# Patient Record
Sex: FEMALE | Race: WHITE
[De-identification: names, ages, dates, MRNs, and addresses within clinical notes are randomized per-mention and may not be internally consistent; named-entity substitution may affect disease eponyms.]

---

## 2019-02-07 ENCOUNTER — HOSPITAL ENCOUNTER (OUTPATIENT)
Dept: HOSPITAL 61 - SURGPAT | Age: 44
Discharge: HOME | End: 2019-02-07
Attending: OBSTETRICS & GYNECOLOGY
Payer: OTHER GOVERNMENT

## 2019-02-07 DIAGNOSIS — I10: ICD-10-CM

## 2019-02-07 DIAGNOSIS — Z01.818: Primary | ICD-10-CM

## 2019-02-07 DIAGNOSIS — Z90.710: ICD-10-CM

## 2019-02-07 LAB
ALBUMIN SERPL-MCNC: 3.5 G/DL (ref 3.4–5)
ALBUMIN/GLOB SERPL: 0.9 {RATIO} (ref 1–1.7)
ALP SERPL-CCNC: 73 U/L (ref 46–116)
ALT SERPL-CCNC: 32 U/L (ref 14–59)
ANION GAP SERPL CALC-SCNC: 9 MMOL/L (ref 6–14)
APTT PPP: YELLOW S
AST SERPL-CCNC: 17 U/L (ref 15–37)
BACTERIA #/AREA URNS HPF: (no result) /HPF
BASOPHILS # BLD AUTO: 0 X10^3/UL (ref 0–0.2)
BASOPHILS NFR BLD: 1 % (ref 0–3)
BILIRUB SERPL-MCNC: 0.2 MG/DL (ref 0.2–1)
BILIRUB UR QL STRIP: NEGATIVE
BUN SERPL-MCNC: 11 MG/DL (ref 7–20)
BUN/CREAT SERPL: 12 (ref 6–20)
CALCIUM SERPL-MCNC: 9.1 MG/DL (ref 8.5–10.1)
CHLORIDE SERPL-SCNC: 104 MMOL/L (ref 98–107)
CO2 SERPL-SCNC: 29 MMOL/L (ref 21–32)
CREAT SERPL-MCNC: 0.9 MG/DL (ref 0.6–1)
EOSINOPHIL NFR BLD: 0.2 X10^3/UL (ref 0–0.7)
EOSINOPHIL NFR BLD: 5 % (ref 0–3)
ERYTHROCYTE [DISTWIDTH] IN BLOOD BY AUTOMATED COUNT: 16.7 % (ref 11.5–14.5)
FIBRINOGEN PPP-MCNC: CLEAR MG/DL
GFR SERPLBLD BASED ON 1.73 SQ M-ARVRAT: 68.7 ML/MIN
GLOBULIN SER-MCNC: 3.8 G/DL (ref 2.2–3.8)
GLUCOSE SERPL-MCNC: 116 MG/DL (ref 70–99)
HCT VFR BLD CALC: 33.6 % (ref 36–47)
HGB BLD-MCNC: 10.9 G/DL (ref 12–15.5)
LYMPHOCYTES # BLD: 1 X10^3/UL (ref 1–4.8)
LYMPHOCYTES NFR BLD AUTO: 27 % (ref 24–48)
MCH RBC QN AUTO: 25 PG (ref 25–35)
MCHC RBC AUTO-ENTMCNC: 33 G/DL (ref 31–37)
MCV RBC AUTO: 78 FL (ref 79–100)
MONO #: 0.3 X10^3/UL (ref 0–1.1)
MONOCYTES NFR BLD: 8 % (ref 0–9)
NEUT #: 2.2 X10^3UL (ref 1.8–7.7)
NEUTROPHILS NFR BLD AUTO: 60 % (ref 31–73)
NITRITE UR QL STRIP: NEGATIVE
PH UR STRIP: 7 [PH]
PLATELET # BLD AUTO: 409 X10^3/UL (ref 140–400)
POTASSIUM SERPL-SCNC: 3.6 MMOL/L (ref 3.5–5.1)
PROT SERPL-MCNC: 7.3 G/DL (ref 6.4–8.2)
PROT UR STRIP-MCNC: NEGATIVE MG/DL
RBC # BLD AUTO: 4.31 X10^6/UL (ref 3.5–5.4)
RBC #/AREA URNS HPF: (no result) /HPF (ref 0–2)
SODIUM SERPL-SCNC: 142 MMOL/L (ref 136–145)
SQUAMOUS #/AREA URNS LPF: (no result) /LPF
UROBILINOGEN UR-MCNC: 0.2 MG/DL
WBC # BLD AUTO: 3.7 X10^3/UL (ref 4–11)
WBC #/AREA URNS HPF: (no result) /HPF (ref 0–4)

## 2019-02-07 PROCEDURE — 81001 URINALYSIS AUTO W/SCOPE: CPT

## 2019-02-07 PROCEDURE — 80053 COMPREHEN METABOLIC PANEL: CPT

## 2019-02-07 PROCEDURE — 85025 COMPLETE CBC W/AUTO DIFF WBC: CPT

## 2019-02-07 PROCEDURE — 93005 ELECTROCARDIOGRAM TRACING: CPT

## 2019-02-07 PROCEDURE — 36415 COLL VENOUS BLD VENIPUNCTURE: CPT

## 2019-02-07 PROCEDURE — 71046 X-RAY EXAM CHEST 2 VIEWS: CPT

## 2019-02-07 NOTE — EKG
[address] [place] [address]

Test Date:    2019               Test Time:    15:07:28

Pat Name:     GRETCHEN ARANA             Department:   

Patient ID:   [id number]           Room:          

Gender:       F                        Technician:   CALVIN

:          1975               Requested By: ABDI ANAND

Order Number: [id number]           Reading MD:   Flex Duncan

                                 Measurements

Intervals                              Axis          

Rate:         94                       P:            40

IL:           148                      QRS:          15

QRSD:         76                       T:            14

QT:           350                                    

QTc:          443                                    

                           Interpretive Statements

SINUS RHYTHM

NONSPECIFIC ST-T WAVE CHANGES.

RI6.01

No previous ECG available for comparison



Electronically Signed On 2019 18:31:53 CST by Flex Duncan

## 2019-02-07 NOTE — RAD
Chest, PA and Lateral:

 

Technique:  PA and lateral views of the chest were obtained.

 

History:  Preop hysterectomy.

 

Comparison: None.

 

Findings:

 The heart and pulmonary vasculature appear within normal limits. The 

lungs are clear. The pleural margins are clear.

 

Impression:

 

No acute chest process is seen.  

 

Electronically signed by: Pravin Valladares MD (2/7/2019 4:22 PM) [id number]

## 2019-02-13 ENCOUNTER — HOSPITAL ENCOUNTER (INPATIENT)
Dept: HOSPITAL 61 - SURG | Age: 44
LOS: 2 days | Discharge: HOME | DRG: 743 | End: 2019-02-15
Attending: OBSTETRICS & GYNECOLOGY | Admitting: OBSTETRICS & GYNECOLOGY
Payer: OTHER GOVERNMENT

## 2019-02-13 VITALS — DIASTOLIC BLOOD PRESSURE: 74 MMHG | SYSTOLIC BLOOD PRESSURE: 113 MMHG

## 2019-02-13 VITALS — SYSTOLIC BLOOD PRESSURE: 106 MMHG | DIASTOLIC BLOOD PRESSURE: 71 MMHG

## 2019-02-13 VITALS — DIASTOLIC BLOOD PRESSURE: 58 MMHG | SYSTOLIC BLOOD PRESSURE: 102 MMHG

## 2019-02-13 VITALS — SYSTOLIC BLOOD PRESSURE: 95 MMHG | DIASTOLIC BLOOD PRESSURE: 55 MMHG

## 2019-02-13 VITALS — SYSTOLIC BLOOD PRESSURE: 125 MMHG | DIASTOLIC BLOOD PRESSURE: 83 MMHG

## 2019-02-13 VITALS — DIASTOLIC BLOOD PRESSURE: 86 MMHG | SYSTOLIC BLOOD PRESSURE: 117 MMHG

## 2019-02-13 VITALS — WEIGHT: 196 LBS | HEIGHT: 64 IN | BODY MASS INDEX: 33.46 KG/M2

## 2019-02-13 VITALS — DIASTOLIC BLOOD PRESSURE: 55 MMHG | SYSTOLIC BLOOD PRESSURE: 103 MMHG

## 2019-02-13 VITALS — DIASTOLIC BLOOD PRESSURE: 69 MMHG | SYSTOLIC BLOOD PRESSURE: 109 MMHG

## 2019-02-13 DIAGNOSIS — D64.9: ICD-10-CM

## 2019-02-13 DIAGNOSIS — Z53.31: ICD-10-CM

## 2019-02-13 DIAGNOSIS — N85.2: ICD-10-CM

## 2019-02-13 DIAGNOSIS — N85.4: ICD-10-CM

## 2019-02-13 DIAGNOSIS — D25.9: Primary | ICD-10-CM

## 2019-02-13 DIAGNOSIS — N73.6: ICD-10-CM

## 2019-02-13 DIAGNOSIS — N92.0: ICD-10-CM

## 2019-02-13 LAB
ERYTHROCYTE [DISTWIDTH] IN BLOOD BY AUTOMATED COUNT: 15.8 % (ref 11.5–14.5)
HCT VFR BLD CALC: 15.5 % (ref 36–47)
HCT VFR BLD CALC: 26.7 % (ref 36–47)
HGB BLD-MCNC: 5.1 G/DL (ref 12–15.5)
HGB BLD-MCNC: 9.1 G/DL (ref 12–15.5)
MCH RBC QN AUTO: 26 PG (ref 25–35)
MCHC RBC AUTO-ENTMCNC: 33 G/DL (ref 31–37)
MCHC RBC AUTO-ENTMCNC: 34 G/DL (ref 31–37)
MCV RBC AUTO: 78 FL (ref 79–100)
PLATELET # BLD AUTO: 305 X10^3/UL (ref 140–400)
RBC # BLD AUTO: 1.98 X10^6/UL (ref 3.5–5.4)
U PREG PATIENT: NEGATIVE
WBC # BLD AUTO: 10.6 X10^3/UL (ref 4–11)

## 2019-02-13 PROCEDURE — 30233N1 TRANSFUSION OF NONAUTOLOGOUS RED BLOOD CELLS INTO PERIPHERAL VEIN, PERCUTANEOUS APPROACH: ICD-10-PCS | Performed by: OBSTETRICS & GYNECOLOGY

## 2019-02-13 PROCEDURE — 0UT70ZZ RESECTION OF BILATERAL FALLOPIAN TUBES, OPEN APPROACH: ICD-10-PCS | Performed by: OBSTETRICS & GYNECOLOGY

## 2019-02-13 PROCEDURE — 0UJD4ZZ INSPECTION OF UTERUS AND CERVIX, PERCUTANEOUS ENDOSCOPIC APPROACH: ICD-10-PCS | Performed by: OBSTETRICS & GYNECOLOGY

## 2019-02-13 PROCEDURE — 86920 COMPATIBILITY TEST SPIN: CPT

## 2019-02-13 PROCEDURE — 0UT10ZZ RESECTION OF LEFT OVARY, OPEN APPROACH: ICD-10-PCS | Performed by: OBSTETRICS & GYNECOLOGY

## 2019-02-13 PROCEDURE — 86901 BLOOD TYPING SEROLOGIC RH(D): CPT

## 2019-02-13 PROCEDURE — 85027 COMPLETE CBC AUTOMATED: CPT

## 2019-02-13 PROCEDURE — 86850 RBC ANTIBODY SCREEN: CPT

## 2019-02-13 PROCEDURE — 86900 BLOOD TYPING SEROLOGIC ABO: CPT

## 2019-02-13 PROCEDURE — 36415 COLL VENOUS BLD VENIPUNCTURE: CPT

## 2019-02-13 PROCEDURE — P9016 RBC LEUKOCYTES REDUCED: HCPCS

## 2019-02-13 PROCEDURE — 0DNM0ZZ RELEASE DESCENDING COLON, OPEN APPROACH: ICD-10-PCS | Performed by: OBSTETRICS & GYNECOLOGY

## 2019-02-13 PROCEDURE — 81025 URINE PREGNANCY TEST: CPT

## 2019-02-13 PROCEDURE — P9045 ALBUMIN (HUMAN), 5%, 250 ML: HCPCS

## 2019-02-13 PROCEDURE — 88307 TISSUE EXAM BY PATHOLOGIST: CPT

## 2019-02-13 PROCEDURE — 0UT90ZZ RESECTION OF UTERUS, OPEN APPROACH: ICD-10-PCS | Performed by: OBSTETRICS & GYNECOLOGY

## 2019-02-13 PROCEDURE — 85018 HEMOGLOBIN: CPT

## 2019-02-13 PROCEDURE — 85014 HEMATOCRIT: CPT

## 2019-02-13 PROCEDURE — A7015 AEROSOL MASK USED W NEBULIZE: HCPCS

## 2019-02-13 PROCEDURE — 80048 BASIC METABOLIC PNL TOTAL CA: CPT

## 2019-02-13 RX ADMIN — MORPHINE SULFATE PRN MG: 4 INJECTION, SOLUTION INTRAMUSCULAR; INTRAVENOUS at 19:44

## 2019-02-13 RX ADMIN — HYDROCODONE BITARTRATE AND ACETAMINOPHEN PRN TAB: 5; 325 TABLET ORAL at 22:11

## 2019-02-13 RX ADMIN — MORPHINE SULFATE PRN MG: 4 INJECTION, SOLUTION INTRAMUSCULAR; INTRAVENOUS at 17:23

## 2019-02-13 NOTE — PDOC
BRIEF OPERATIVE NOTE


Date:  Feb 13, 2019


Pre-Op Diagnosis


menorrhagia, enlarged uterus, anemia


Post-Op Diagnosis


same plus pelvic adhesive disease


Procedure Performed


operative scope converted to SARAH BETH/LSO/right salpingectomy/adhesiolysis


Surgeon


Dr. Patricia Lopez


Assistant


Cherry Monteiro


Anesthesiologist


Dr. Lerma


Anesthesia Type:  General


Blood Loss


2400cc


IV Fluid


4L crystalloid, 1000cc albumin


Urine Output


150cc clear via lorenzo


Specimens Obtained


cervix, uterus, Left tube and ovary, right tube


Findings


enlarged RV uterus, severe pelvic adhesive disease with Left tube/ovary adhesed 

to posterior uterus and desc colon; thick area of desc colon adhesed to 

posterior uterine wall, right tube/ovary adhesed to posterior uterus and right 

pelvic side wall


Complications


none


Operative Note


[id number]











PATRICIA LOPEZ MD Feb 13, 2019 14:20

## 2019-02-13 NOTE — OP
DATE OF SURGERY:  02/13/2019



PREOPERATIVE DIAGNOSES:  Menorrhagia, enlarged uterus and anemia.



POSTOPERATIVE DIAGNOSES:  Menorrhagia, enlarged uterus and anemia with suspected

endometriosis and moderate to severe pelvic adhesive disease.



PROCEDURE:  Operative laparoscopy converted to a SARAH BETH-LSO, right salpingectomy

and adhesiolysis encompassing at least 15 minutes.



SURGEON:  Abdi Anand MD.



ASSISTANT:  ZEV Bell.



ANESTHESIOLOGIST:  Lino Lerma MD.



ANESTHESIA:  General.



IV FLUIDS:  4 liters of crystalloid and 1000 mL of albumin.



ESTIMATED BLOOD LOSS:  2400 mL.



URINE OUTPUT:  150 mL.  Clear via Keene catheter.



SPECIMEN OBTAINED:  Cervix, uterus, left tube and ovary, right tube.



FINDINGS:  An enlarged retroverted uterus, severe pelvic adhesive disease with

the left tube and ovary adhesed to the posterior uterus and the descending

colon, a thick area of the descending colon adhesed to the posterior uterine

wall, right tube and ovary adhesed to the posterior uterus and the right pelvic

sidewall.



COMPLICATIONS:  None.



DESCRIPTION OF PROCEDURE:  This patient was taken to the operating room where

general anesthesia was placed.  The patient was placed in a dorsal lithotomy

position in [facility].  The patient's abdomen and vagina were prepped and

draped in the normal sterile fashion and a Keene catheter had been inserted

under sterile technique.  After a timeout was performed, a bivalve speculum was

placed in the patient's vagina.  A single tooth tenaculum was used to grasp the

anterior lip of the cervix.  A 10 mL of 0.25% Marcaine with epinephrine was used

to circumferentially inject around the cervix for both hemodissection and

hemostatic purposes later.  The Valtchev uterine manipulator was placed through

the endocervical os, locked on the single tooth tenaculum and the bivalve

speculum was then removed.



Top gloves were discarded and changed.



Attention was then turned to the abdomen where a small supraumbilical skin

incision was made with the scalpel.  A curved Crystal was used to dissect through

the subcuticular layer to the fascia.  The 5 mm Visiport was used to directly

enter the abdominal cavity.  Opening patient pressure was 2-3 mmHg.  Carbon

dioxide gas was used to then appropriately insufflate the abdominal cavity to

maintain a pressure of 15 mmHg.  The patient was placed in Trendelenburg

position.  Initially grossly the bowel looked normal, omentum looked normal. 

Uterus looked enlarged and retroverted, but I could not see tubes and ovaries;

bladder appeared normal.  So, I went ahead and placed right and left lower

quadrant ports under direct visualization.  They were all clear on the inside,

so transilluminating the abdominal wall, finding an area clear of any

vasculature, making a small incision and placing the 5 mm disposable port under

direct visualization without difficulty, placing 2 mL of air in each of the

cuff.  I then did move the camera, looked at the umbilical port, which was clear

and placed the air in that one as well.  At this point, the LigaSure was

obtained and the Maryland's and we went to go manipulate the uterus to look for

tubes and ovaries and found pretty severe pelvic adhesive disease on the

posterior uterus.  The left tube and ovary were completely adhesed to the

posterior uterine wall as well as a segment of the descending colon.  Descending

colon had a large segment adhesed to the posterior uterine wall and the right

tube and ovary were adhesed to the posterior uterus and the right pelvic

sidewall.  After working for a little bit laparoscopically, it was decided

especially with the dense colon adhesion, to go ahead and convert to an open

procedure as we could not see how far back it went and how deep in went and the

left tube and ovary were pretty adhesed as well, not just to the uterus, which

was easy to take down, but to the colon as well, so it was decided to convert to

open.  So, we took the right and left lower quadrant ports out under direct

visualization.  We released the gas from the cuff, removed them under direct

visualization.  They were hemostatic.  Gas was released from the umbilical port,

2 mL of air was taken out of it as well.  All three of those were closed with

4-0 nylon at the skin and injected with local.  At this point, a small

Pfannenstiel skin incision was made with the scalpel.  Bovie cautery was used in

the subcuticular layer to maintain hemostasis.  The fascia was then scored in

the midline and extended sharply and bluntly bilaterally.  Kocher clamps x 2

were placed on the superior fascial edge and the fascia was dissected from the

rectus muscles beneath sharply and bluntly as well.  Bovie cautery again was

used for hemostasis in this layer.  The Kochers were then put inferiorly and

taken down to the pubic bone and the curved Raymundo scissors were used to take the

rectus muscles off the fascia below.  The rectus muscles were  in the

midline and the peritoneum was digitally and bluntly entered and stretched.



The O'Alexys-O'Scott retractor was placed, first packing away the bowel with

3 moist laps, placing it in and then placing an upper blade and then a bladder

blade lower, placing a curved long pean on the right cornua.  The left cornua

was so adhesed and pulled down posteriorly, I could not even see it.  So, I

started with the right round ligament, placing 2-0 Vicryl sutures in it, taking

the Bovie cautery and going between it and then starting to create the bladder

flap anteriorly.  I was able to just manually move the uterus over and tried to

put a curved pean on just the side of the uterus, I did not have the cornua

because it was still being tented and pulled around from that tube and ovary

adhesion posteriorly and twisting the uterus around, but I was able to find the

round ligament on the left side, so I placed 2-0 Vicryl stitches on this was

well, cauterized between them and then went down and further met that bladder

flap anteriorly.  I was able to get the right tube up, but the ovary was still

stuck, so I went ahead and took the right tube up and went on the right uterine

ovarian pedicle and double clamped it with curved Heaneys, cut with Raymundo

scissors and suture ligated x 2 and then skeletonized the right side, so I could

get the curved Heaneys around the right uterine vessels since the bladder was

down in front.  Those were doubly clamped.  A straight Kocher was placed for

back bleeding and curved Raymundo scissors were used to cut it and then suture

ligate it with 0 Vicryl x 2.  Staying inside that pedicle, again making sure the

bladder was down, straight Heaneys were used to hug the cervix and go through

the cardinal and broad ligaments in a couple bites on the right side, double

clamping them with curved Heaneys, cutting with the long knife and then suture

ligating x 2 with 0 Vicryl.  At this point, I did switch sides and I went ahead

and just cut the uterine-ovarian pedicle on this side leaving the left tube and

ovary as they were severely adhesed.  Using blunt dissection to go down the back

of the uterus, I was able to get the colon off the back of the uterus as well as

the tube and ovary, but I just focused on the hysterectomy at this point and

went back at the end and looked at both ovaries.  So at this point, I was

skeletonizing the left side and then curved Heaneys were placed over the uterine

vessels on this side, again a straight Kocher for backbleeding and cutting with

Raymundo scissors and suture ligating x 2 with 0 Vicryl.



Staying inside this pedicle again making sure the bladder was down in front,

straight Heaneys were placed inside that pedicle going through the cardinal and

broad ligaments hugging the cervix, cutting with the long knife and suture

ligating with 0 Vicryl.  Once this had been done, posteriorly was clear,

anteriorly was good and the bladder was down.  I believe a couple of more

straight Heaneys were taken until I got to the uterosacrals and curved Heaneys

were placed over this where they were doubly clamped, cut with Raymundo scissors and

entering the vagina and then using the curved scissors and Jose's to

amputate the cervix and uterus.  Cervix and uterus were passed off in total. 

Long Kocher's were placed on the vaginal cuff anterior and posterior and then in

interrupted sutures with 2-0 Vicryl, first taking the corner stitch through the

uterosacral and tagging them on both sides, interrupted sutures and then in a

series of figure-of-eight interrupted stitches, 4 or 5 stitches going across

with 2-0 Vicryls closing the cuff and tagging them.  Once this was done, I went

back, the right ovary looked good.  I did close the peritoneum over it as it was

adhesed with a 2-0 Vicryl and closed that with excellent results.  There was

some friable bleeding from the surface of the colon where it had been peeled

off, the posterior uterine wall, but nothing overtly bleeding, just surface raw

edge bleeding.  The cuff looked good and now I was looking at the left tube and

ovary.  This was completely adhesed and friable and had a cyst on it, so I went

ahead and decided to take the left tube and ovary and it looked like it had an

endometrial implant on it and a cyst, so I took a Esperanza, elevated the left

tube and ovary, finished taking down all the adhesions, could feel the ureter

low and stayed high right under the ovary on the IP ligament, double clamping it

with curved Heaneys and cutting with the curved Raymundo scissors and removed the

left tube and ovary.  So at this point, all that remained was the right ovary,

so the left tube and ovary were passed off as a separate specimen at the end. 

The cuff had been closed.  The right tube came out initially, so the right ovary

remains for hormones per patient request.  I irrigated with copious irrigation. 

There was some friable bleeding I had mentioned earlier.  I put Emperatriz over this

with excellent results.  Again, the remaining right ovary looked good.  The cuff

looked good.  The left ovarian pedicle looked good.  The round ligaments looked

good.  So all of the tags were clipped at this point and the rectus muscles and

subcuticular layer were examined, they were hemostatic.  So, 0 Vicryl was used

to close from the patient's right to just past midline and left to just past

midline with two different 0 Vicryl and tied together in the middle.  Copious

irrigation in the subcutaneous again then revealed hemostasis and 3-0 Vicryl was

used to close the subcuticular layer, Karo's layer and then 3-0 Monocryl was

being used to close the skin with Steri-Strips and Dermabond.  The patient was

stable and is currently being awakened from anesthesia.

 



______________________________

ABDI ANAND MD



DR:  CATY/jen  JOB#:  [id number] / [id number]

DD:  02/13/2019 14:19  DT:  02/13/2019 15:51

## 2019-02-14 VITALS — SYSTOLIC BLOOD PRESSURE: 109 MMHG | DIASTOLIC BLOOD PRESSURE: 75 MMHG

## 2019-02-14 VITALS — DIASTOLIC BLOOD PRESSURE: 87 MMHG | SYSTOLIC BLOOD PRESSURE: 129 MMHG

## 2019-02-14 VITALS — DIASTOLIC BLOOD PRESSURE: 82 MMHG | SYSTOLIC BLOOD PRESSURE: 124 MMHG

## 2019-02-14 VITALS — SYSTOLIC BLOOD PRESSURE: 122 MMHG | DIASTOLIC BLOOD PRESSURE: 90 MMHG

## 2019-02-14 LAB
ANION GAP SERPL CALC-SCNC: 8 MMOL/L (ref 6–14)
BUN SERPL-MCNC: 10 MG/DL (ref 7–20)
CALCIUM SERPL-MCNC: 7.8 MG/DL (ref 8.5–10.1)
CHLORIDE SERPL-SCNC: 107 MMOL/L (ref 98–107)
CO2 SERPL-SCNC: 26 MMOL/L (ref 21–32)
CREAT SERPL-MCNC: 0.7 MG/DL (ref 0.6–1)
GFR SERPLBLD BASED ON 1.73 SQ M-ARVRAT: 91.8 ML/MIN
GLUCOSE SERPL-MCNC: 121 MG/DL (ref 70–99)
POTASSIUM SERPL-SCNC: 3.9 MMOL/L (ref 3.5–5.1)
SODIUM SERPL-SCNC: 141 MMOL/L (ref 136–145)

## 2019-02-14 RX ADMIN — HYDROCODONE BITARTRATE AND ACETAMINOPHEN PRN TAB: 5; 325 TABLET ORAL at 05:40

## 2019-02-14 RX ADMIN — LISINOPRIL SCH MG: 20 TABLET ORAL at 09:00

## 2019-02-14 RX ADMIN — SIMETHICONE CHEW TAB 80 MG PRN MG: 80 TABLET ORAL at 16:04

## 2019-02-14 RX ADMIN — CALCIUM CARBONATE (ANTACID) CHEW TAB 500 MG PRN MG: 500 CHEW TAB at 09:25

## 2019-02-14 RX ADMIN — HYDROCODONE BITARTRATE AND ACETAMINOPHEN PRN TAB: 5; 325 TABLET ORAL at 16:04

## 2019-02-14 RX ADMIN — HYDROCODONE BITARTRATE AND ACETAMINOPHEN PRN TAB: 5; 325 TABLET ORAL at 10:10

## 2019-02-14 RX ADMIN — CALCIUM CARBONATE (ANTACID) CHEW TAB 500 MG PRN MG: 500 CHEW TAB at 16:02

## 2019-02-14 RX ADMIN — SIMETHICONE CHEW TAB 80 MG PRN MG: 80 TABLET ORAL at 09:25

## 2019-02-14 NOTE — PDOC
SURGICAL PROGRESS NOTE


Subjective


feeling better, up drinking coffee.  Moderate pain with moving


Vital Signs





Vital Signs








  Date Time  Temp Pulse Resp B/P (MAP) Pulse Ox O2 Delivery O2 Flow Rate FiO2


 


2/14/19 06:30 97.9 117 18 109/75 (86)  Room Air 98.0 





 97.9       


 


2/13/19 23:02     99   








I&O











Intake and Output 


 


 2/14/19





 07:00


 


Intake Total 5470 ml


 


Output Total 3675 ml


 


Balance 1795 ml


 


 


 


Intake Oral 700 ml


 


IV Total 4050 ml


 


Blood Product IV Normal Saline Flush 720 ml


 


Output Urine Total 1275 ml


 


Estimated Blood Loss 2400 ml








PATIENT HAS A GOMEZ:  No


General:  Alert, Oriented X3, Cooperative, mild distress


HEENT:  Atraumatic


Heart:  Regular rate


Abdomen:  Soft, No tenderness, No masses (bandage c/d/i)


Extremities:  No clubbing, No cyanosis


Skin:  No rashes, No breakdown


Neuro:  Normal speech


Psych/Mental Status:  Mental status NL, Mood NL


Labs





Laboratory Tests








Test


 2/13/19


08:45 2/13/19


14:40 2/13/19


23:30 2/14/19


06:15


 


Urine Pregnancy Test Negative (NEG)    


 


White Blood Count


 


 10.6 x10^3/uL


(4.0-11.0) 


 





 


Red Blood Count


 


 1.98 x10^6/uL


(3.50-5.40) 


 





 


Hemoglobin


 


 5.1 g/dL


(12.0-15.5) 9.1 g/dL


(12.0-15.5) 





 


Hematocrit


 


 15.5 %


(36.0-47.0) 26.7 %


(36.0-47.0) 25.3 %


(36.0-47.0)


 


Mean Corpuscular Volume  78 fL ()   


 


Mean Corpuscular Hemoglobin  26 pg (25-35)   


 


Mean Corpuscular Hemoglobin


Concent 


 33 g/dL


(31-37) 34 g/dL


(31-37) 





 


Red Cell Distribution Width


 


 15.8 %


(11.5-14.5) 


 





 


Platelet Count


 


 305 x10^3/uL


(140-400) 


 





 


Sodium Level


 


 


 


 141 mmol/L


(136-145)


 


Potassium Level


 


 


 


 3.9 mmol/L


(3.5-5.1)


 


Chloride Level


 


 


 


 107 mmol/L


()


 


Carbon Dioxide Level


 


 


 


 26 mmol/L


(21-32)


 


Anion Gap    8 (6-14) 


 


Blood Urea Nitrogen


 


 


 


 10 mg/dL


(7-20)


 


Creatinine


 


 


 


 0.7 mg/dL


(0.6-1.0)


 


Estimated GFR


(Cockcroft-Gault) 


 


 


 91.8 





 


Glucose Level


 


 


 


 121 mg/dL


(70-99)


 


Calcium Level


 


 


 


 7.8 mg/dL


(8.5-10.1)








Laboratory Tests








Test


 2/13/19


08:45 2/13/19


14:40 2/13/19


23:30 2/14/19


06:15


 


Urine Pregnancy Test Negative (NEG)    


 


White Blood Count


 


 10.6 x10^3/uL


(4.0-11.0) 


 





 


Red Blood Count


 


 1.98 x10^6/uL


(3.50-5.40) 


 





 


Hemoglobin


 


 5.1 g/dL


(12.0-15.5) 9.1 g/dL


(12.0-15.5) 





 


Hematocrit


 


 15.5 %


(36.0-47.0) 26.7 %


(36.0-47.0) 25.3 %


(36.0-47.0)


 


Mean Corpuscular Volume  78 fL ()   


 


Mean Corpuscular Hemoglobin  26 pg (25-35)   


 


Mean Corpuscular Hemoglobin


Concent 


 33 g/dL


(31-37) 34 g/dL


(31-37) 





 


Red Cell Distribution Width


 


 15.8 %


(11.5-14.5) 


 





 


Platelet Count


 


 305 x10^3/uL


(140-400) 


 





 


Sodium Level


 


 


 


 141 mmol/L


(136-145)


 


Potassium Level


 


 


 


 3.9 mmol/L


(3.5-5.1)


 


Chloride Level


 


 


 


 107 mmol/L


()


 


Carbon Dioxide Level


 


 


 


 26 mmol/L


(21-32)


 


Anion Gap    8 (6-14) 


 


Blood Urea Nitrogen


 


 


 


 10 mg/dL


(7-20)


 


Creatinine


 


 


 


 0.7 mg/dL


(0.6-1.0)


 


Estimated GFR


(Cockcroft-Gault) 


 


 


 91.8 





 


Glucose Level


 


 


 


 121 mg/dL


(70-99)


 


Calcium Level


 


 


 


 7.8 mg/dL


(8.5-10.1)








I have reviewed the following


labs, vitals, nursing


Cardiovascular:  No pertinent hx


Pulmonary:  No pertinent hx


GI:  No pertinent hx


Heme/Onc:  Anemia NOS


Psych:  Anxiety


Assessment/Plan


POD #1 s/p SARAH BETH/LSO/right salpingectomy/adhesiolysis


routine po care


advance diet 


ambulate 


continue to monitor











ABDI ANAND MD Feb 14, 2019 08:51

## 2019-02-15 VITALS — DIASTOLIC BLOOD PRESSURE: 78 MMHG | SYSTOLIC BLOOD PRESSURE: 117 MMHG

## 2019-02-15 VITALS — DIASTOLIC BLOOD PRESSURE: 68 MMHG | SYSTOLIC BLOOD PRESSURE: 109 MMHG

## 2019-02-15 RX ADMIN — HYDROCODONE BITARTRATE AND ACETAMINOPHEN PRN TAB: 5; 325 TABLET ORAL at 05:39

## 2019-02-15 RX ADMIN — HYDROCODONE BITARTRATE AND ACETAMINOPHEN PRN TAB: 5; 325 TABLET ORAL at 00:28

## 2019-02-15 RX ADMIN — LISINOPRIL SCH MG: 20 TABLET ORAL at 09:00

## 2019-02-15 NOTE — PDOC
SURGICAL PROGRESS NOTE


Subjective


Doing well without complaints.  Scant VB.  Tolerating regular diet, voiding 

without catheter and wants to go home.


Vital Signs





Vital Signs








  Date Time  Temp Pulse Resp B/P (MAP) Pulse Ox O2 Delivery O2 Flow Rate FiO2


 


2/15/19 06:40   16   Room Air  


 


2/15/19 05:39     94   


 


2/15/19 05:15 100.0 118  109/68 (82)    





 100.0 118      


 


2/14/19 08:00       2.0 








I&O











Intake and Output 


 


 2/15/19





 07:00


 


Intake Total 2200 ml


 


Output Total 550 ml


 


Balance 1650 ml


 


 


 


Intake Oral 1800 ml


 


Other 400 ml


 


Output Urine Total 550 ml


 


# Voids 3


 


# Bowel Movements 1








PATIENT HAS A GOMEZ:  No


General:  Alert, Oriented X3, Cooperative, No acute distress


HEENT:  Atraumatic


Heart:  Regular rate


Abdomen:  Soft, No tenderness, Other (incisions and port sites all c/d/i)


Extremities:  No clubbing, No cyanosis, No edema, No tenderness/swelling


Skin:  No rashes, No breakdown


Neuro:  Normal speech


Psych/Mental Status:  Mental status NL, Mood NL


Labs





Laboratory Tests








Test


 2/13/19


14:40 2/13/19


23:30 2/14/19


06:15


 


White Blood Count


 10.6 x10^3/uL


(4.0-11.0) 


 





 


Red Blood Count


 1.98 x10^6/uL


(3.50-5.40) 


 





 


Hemoglobin


 5.1 g/dL


(12.0-15.5) 9.1 g/dL


(12.0-15.5) 





 


Hematocrit


 15.5 %


(36.0-47.0) 26.7 %


(36.0-47.0) 25.3 %


(36.0-47.0)


 


Mean Corpuscular Volume 78 fL ()   


 


Mean Corpuscular Hemoglobin 26 pg (25-35)   


 


Mean Corpuscular Hemoglobin


Concent 33 g/dL


(31-37) 34 g/dL


(31-37) 





 


Red Cell Distribution Width


 15.8 %


(11.5-14.5) 


 





 


Platelet Count


 305 x10^3/uL


(140-400) 


 





 


Sodium Level


 


 


 141 mmol/L


(136-145)


 


Potassium Level


 


 


 3.9 mmol/L


(3.5-5.1)


 


Chloride Level


 


 


 107 mmol/L


()


 


Carbon Dioxide Level


 


 


 26 mmol/L


(21-32)


 


Anion Gap   8 (6-14) 


 


Blood Urea Nitrogen


 


 


 10 mg/dL


(7-20)


 


Creatinine


 


 


 0.7 mg/dL


(0.6-1.0)


 


Estimated GFR


(Cockcroft-Gault) 


 


 91.8 





 


Glucose Level


 


 


 121 mg/dL


(70-99)


 


Calcium Level


 


 


 7.8 mg/dL


(8.5-10.1)








I have reviewed the following


labs, vitals, nursing


Cardiovascular:  HTN


Pulmonary:  No pertinent hx


GI:  No pertinent hx


Heme/Onc:  Anemia NOS


Psych:  Anxiety


Rheumatologic:  No pertinent hx


Infectious disease:  No pertinent hx


Assessment/Plan


POD #2 s/p operative scope converted to SARAH BETH/LSO/right salpingectomy and 

adhesiolysis


chonic anemia with acute anemia of surgery


s/p 3u [facility] day of surgery with excellent response, and stable since then


routine po care


d/c to home


NPV x 6 weeks


light/limited activity x 2 weeks


keep scheduled follow up in one week


already has pain pills filled at home


NO driving while on narcotic pain meds


ok for OTC ibuprofen if needed


call or return sooner for any other questions or concerns not limited to but 

including pain unrelieved with pain meds, increased or unexplained vaginal 

bleeding or T>100.4











ABDI ANAND MD Feb 15, 2019 09:11

## 2019-02-15 NOTE — PATHOLOGY
[facility] Accession Number: [id number]

.                                                                01

Material submitted:                                        .

UTERUS, CERVIX, BILATHERAL FALLOPIAN TUBES , AND LEFT OVARY

.                                                                01

Clinical history:                                          .

Abnormal vaginal bleeding

.                                                                02

**********************************************************************

Diagnosis:

Uterus and attached right fallopian tube and detached left fallopian tube

and ovary, total abdominal hysterectomy with bilateral salpingectomy and

left oophorectomy:

- Leiomyomas (3), uterine corpus, intramural, the largest measuring

5.8 cm in greatest dimension.

- Uterine serosal adhesions.

- Mild chronic cervicitis with focal squamous metaplasia.

- Endocervical polyp.

- Proliferative endometrium.

- Adenomyosis, uterine corpus, sub basal, focal.

- Focal endometriosis of left fallopian tube with focal mild

hematosalpinx.

- Focal endometriosis and capsular adhesions of left ovary.

- Cystic follicles of left ovary.

- Small focus of organizing fat necrosis with calcification of left

ovarian capsule.

- Right fallopian tube showing no diagnostic abnormalities.

[facility]/02/15/2019

**********************************************************************

.                                                                02

Comment:

There is no atypia or evidence of malignancy.

.

(JPM:pit 02/15/2019)

.                                                                02

Electronically signed:                                     .

Ray Steward MD, Pathologist

NPI- [id number]

.                                                                01

Gross description:                                         .

The specimen is received in formalin, labeled "Katalina Carrasco, uterus,

cervix, bilateral fallopian tubes, left ovary".  Received is a 308 g, 12.9

x 8.4 x 7.4 cm uterus with attached cervix, attached right fallopian tube

weighing 4 g, and detached left adnexa weighing 27 g.  The uterine serosa

is light tan in appearance with overlying adhesions, and is slightly

disrupted along the fundal aspect.  The 1.4 cm cervical os is surrounded

by pale tan, smooth ectocervical mucosa.  The posterior aspect of the

cervix is grossly absent.  The uterus is opened laterally to reveal a pale

tan, corrugated endocervical canal measuring 4.2 cm in length displaying

an endocervical polyp measuring 1.6 x 0.7 x 0.3 cm in greatest dimensions.

 The polyp is removed and the margin is inked black.  The endometrial

cavity is tear-drop shaped measuring 5.5 cm in length by 3.4 cm in width.

The endometrium is pale tan to pink-red, glistening in appearance and

measures 0.1 cm in thickness.  Serial sectioning reveals a tan-pink,

trabeculated myometrium measuring up to 6.1 cm in thickness displaying

three intramural fibroids ranging in size from 0.5 to 5.8 cm in maximum

dimensions.

.

The left adnexa consists of a fimbriated fallopian tube measuring 7.8 cm

in length by up to 1.0 cm in diameter attached to a 3.8 x 3.5 x 2.5 cm

disrupted ovary.  Sectioning through the fallopian tube reveals a dilated

lumen.  The ovarian surface displays a pale tan nodule measuring 0.4 cm in

maximum dimensions.  Sectioning through the ovary reveals multiple cystic

structures ranging in size from 0.3 to 0.8 cm filled with blood-tinged

fluid.  Remaining cut surfaces display pale tan, normal ovarian stroma.

.

The right fimbriated fallopian tube measures 6.0 cm in length by up to 0.8

cm in diameter.  Sectioning reveals a pinpoint lumen.  The specimen is

submitted representatively as follows:

.

A1     12:00 cervix

A2     6:00 cervix

A3     serosal adhesions

A4     endocervical polyp, bisected

A5     anterior endomyometrium

A6     posterior endomyometrium

A7-A9  representative sections of fibroids

A10-A12  left adnexa

A13    right fallopian tube.

(CAA; 2/14/2019)

QAC/QAC

.                                                                02

Pathologist provided ICD-10:

D25.1, N72, N84.1, N80.9

.                                                                02

CPT                                                        .

113505

Specimen Comment: A courtesy copy of this report has been sent to

Specimen Comment: [phone].

Specimen Comment: Report sent to 

***Performed at:  01

   [address], [place], [place]  [id number]

   MD Joshua Will MD Phone:  [phone]

***Performed at:  02

   [facility]

   [address] [place], [place]  [id number]

   MD Ray Steward MD Phone:  [phone]

## 2019-02-15 NOTE — PDOC3
Discharge Summary


Visit Information


Date of Admission:  Feb 13, 2019


Date of Discharge:  Feb 15, 2019


Final Diagnosis


menorrhagia, anemia, enlarged uterus, pelvic adhesive disease





Brief Hospital Course


Allergies





 Allergies








Coded Allergies Type Severity Reaction Last Updated Verified


 


  No Known Drug Allergies    2/13/19 No








Vital Signs





Vital Signs








  Date Time  Temp Pulse Resp B/P (MAP) Pulse Ox O2 Delivery O2 Flow Rate FiO2


 


2/15/19 06:40   16   Room Air  


 


2/15/19 05:39     94   


 


2/15/19 05:15 100.0 118  109/68 (82)    





 100.0 118      


 


2/14/19 08:00       2.0 








Lab Results





Laboratory Tests








Test


 2/13/19


14:40 2/13/19


23:30 2/14/19


06:15


 


White Blood Count


 10.6 x10^3/uL


(4.0-11.0) 


 





 


Red Blood Count


 1.98 x10^6/uL


(3.50-5.40) 


 





 


Hemoglobin


 5.1 g/dL


(12.0-15.5) 9.1 g/dL


(12.0-15.5) 





 


Hematocrit


 15.5 %


(36.0-47.0) 26.7 %


(36.0-47.0) 25.3 %


(36.0-47.0)


 


Mean Corpuscular Volume 78 fL ()   


 


Mean Corpuscular Hemoglobin 26 pg (25-35)   


 


Mean Corpuscular Hemoglobin


Concent 33 g/dL


(31-37) 34 g/dL


(31-37) 





 


Red Cell Distribution Width


 15.8 %


(11.5-14.5) 


 





 


Platelet Count


 305 x10^3/uL


(140-400) 


 





 


Sodium Level


 


 


 141 mmol/L


(136-145)


 


Potassium Level


 


 


 3.9 mmol/L


(3.5-5.1)


 


Chloride Level


 


 


 107 mmol/L


()


 


Carbon Dioxide Level


 


 


 26 mmol/L


(21-32)


 


Anion Gap   8 (6-14) 


 


Blood Urea Nitrogen


 


 


 10 mg/dL


(7-20)


 


Creatinine


 


 


 0.7 mg/dL


(0.6-1.0)


 


Estimated GFR


(Cockcroft-Gault) 


 


 91.8 





 


Glucose Level


 


 


 121 mg/dL


(70-99)


 


Calcium Level


 


 


 7.8 mg/dL


(8.5-10.1)








Brief Hospital Course


Ms. Carrasco  is a 43 old female who presented with enlarged fibroid uteurs,

menorrhagia and anemia.  She underwent an attempted LAVH but was converted to 

SARAH BETH due to pelvic adhesive disease involving colon and both tubes and ovaries 

to uterus and side walls.  She underwent SARAH BETH/LSO/right salpingectomy and 

adhesiolysis.  In the recovery room her hgb was 5.1, so she was transfused 3u 

PRBCs the afternoon/evening of her surgery.  2hrs after transfusion she 

responded appropriately with hgb coming up to 9 and rechecked the next morning 

and still stable.  She has since then been doing well, ambulating well, no 

vaginal bleeding, tolerating regular diet and voiding without catheter.  She is 

wanting to go home today.





Discharge Information


Condition at Discharge:  Stable


Follow Up:  Weeks


Disposition/Orders:  D/C to Home


Scheduled


Lisinopril/Hydrochlorothiazide (Lisinopril-Hctz 20-25 Mg Tab) 1 Each Tablet, 1 

TAB PO DAILY for bp, #30 Ref 5 (Reported)


   Entered as Reported by: ROC BARR on 2/7/19 1457


   Last Taken: Unknown Dose on 2/12/19      Last Action: Converted on 2/13/19 

1038 by ABDI ANAND


Venlafaxine Hcl (Venlafaxine Hcl) 75 Mg Tablet, 75 MG PO DAILY for anxiety, (

Reported)


   Entered as Reported by: ROC BARR on 2/7/19 1455


   Last Taken: Unknown Dose on 2/12/19      Last Action: HELD on 2/13/19 1038 

by ABDI ANAND





Patient Instructions


Patient Instructions


POD #2 s/p operative scope converted to SARAH BETH/LSO/right salpingectomy and 

adhesiolysis


chonic anemia with acute anemia of surgery


s/p 3u PBR day of surgery with excellent response, and stable since then


routine po care


d/c to home


NPV x 6 weeks


light/limited activity x 2 weeks


keep scheduled follow up in one week


already has pain pills filled at home


NO driving while on narcotic pain meds


ok for OTC ibuprofen if needed


call or return sooner for any other questions or concerns not limited to but 

including pain unrelieved with pain meds, increased or unexplained vaginal 

bleeding or T>100.4











ABDI ANAND MD Feb 15, 2019 09:16